# Patient Record
Sex: FEMALE | Race: WHITE
[De-identification: names, ages, dates, MRNs, and addresses within clinical notes are randomized per-mention and may not be internally consistent; named-entity substitution may affect disease eponyms.]

---

## 2020-08-10 ENCOUNTER — APPOINTMENT (OUTPATIENT)
Dept: ORTHOPEDIC SURGERY | Facility: CLINIC | Age: 50
End: 2020-08-10
Payer: OTHER MISCELLANEOUS

## 2020-08-10 VITALS
DIASTOLIC BLOOD PRESSURE: 96 MMHG | WEIGHT: 155 LBS | BODY MASS INDEX: 27.46 KG/M2 | HEART RATE: 73 BPM | SYSTOLIC BLOOD PRESSURE: 149 MMHG | HEIGHT: 63 IN

## 2020-08-10 VITALS — TEMPERATURE: 97.1 F

## 2020-08-10 DIAGNOSIS — M17.12 UNILATERAL PRIMARY OSTEOARTHRITIS, LEFT KNEE: ICD-10-CM

## 2020-08-10 PROCEDURE — 73562 X-RAY EXAM OF KNEE 3: CPT | Mod: LT

## 2020-08-10 PROCEDURE — 99204 OFFICE O/P NEW MOD 45 MIN: CPT

## 2020-08-10 NOTE — PHYSICAL EXAM
[Normal] : Gait: normal [Antalgic] : not antalgic [de-identified] : GENERAL APPEARANCE: Well nourished and hydrated, pleasant, alert, and oriented x 3. Appears their stated age. \par HEENT: Normocephalic, extraocular eye motion intact. Nasal septum midline. Oral cavity clear. External auditory canal clear. \par RESPIRATORY: Breath sounds clear and audible in all lobes. No wheezing, No accessory muscle use.\par CARDIOVASCULAR: No apparent abnormalities. No lower leg edema. No varicosities. Pedal pulses are palpable.\par NEUROLOGIC: Sensation is normal, no muscle weakness in the upper or lower extremities.\par DERMATOLOGIC: No apparent skin lesions, moist, warm, no rash.\par SPINE: Cervical spine appears normal and moves freely; thoracic spine appears normal and moves freely; lumbosacral spine appears normal and moves freely, normal, nontender.\par MUSCULOSKELETAL: Hands, wrists, and elbows are normal and move freely, shoulders are normal and move freely. \par Musculoskeletal: Gait: normal.  [de-identified] : Left knee exam shows medial joint line tenderness and FROM [de-identified] : 3V xray of the left knee done in the office today and reviewed by Dr. Richie Pacheco demonstrates mild medial compartmental osteoarthritis\par \par MRI of the left knee performed on 3/30/2018 showed the following:\par \par Sprain medial collateral ligament\par \par Joint effusion and popliteal cyst\par \par Findings suggesting fat pad impingement superior to patella due to quadriceps mechanism with adjacent distal quadriceps reactive edema\par origin medial head of gastrocnemius tendinosis with associated focal intrasubstance partial tear, adjacent ganglion and what appears to be mild adjacent reactive medial femoral condyle bone marrow edema pattern suggesting reactive osteitis\par \par No meniscal tear \par

## 2020-08-10 NOTE — HISTORY OF PRESENT ILLNESS
[Pain Location] : pain [Worsening] : worsening [___ yrs] : [unfilled] year(s) ago [5] : a current pain level of 5/10 [3] : a minimum pain level of 3/10 [8] : a maximum pain level of 8/10 [de-identified] : 48 y/o F presents with left knee pain. The incidence occurred on 2/16/2018. Pt bent down at work. She felt a pop in her lateral knee which resulted in pain in the medial and posterior aspect of the knee. She previously had PT in 2018 and two rounds of HA injections. The first round of HA injections provided relief, whereas the second round of HA injections did not provide relief. The pain is intermittent. She states the pain is sharp. Pt takes Tylenol and Diclofenac which does not provide relief. Pt also c/o of right hip and right knee pain.

## 2020-08-10 NOTE — END OF VISIT
[FreeTextEntry3] : I, Anthony Negro, acted solely as a scribe for Dr. Richie Pacheco on this date 08/10/2020.

## 2020-08-10 NOTE — DISCUSSION/SUMMARY
[de-identified] : 50 y/o F with mild medial compartmental osteoarthritis of the left knee. Conservative therapy and surgical options discussed in detail with patient. Pt should continue to take ibuprofen when she is in pain. We prescribed PT. She will get another MRI of her left knee for further evaluation. F/u with us after the MRI.

## 2022-08-08 ENCOUNTER — CONSULTATION/EVALUATION (OUTPATIENT)
Facility: LOCATION | Age: 52
End: 2022-08-08

## 2022-08-08 DIAGNOSIS — H04.123: ICD-10-CM

## 2022-08-08 DIAGNOSIS — H25.813: ICD-10-CM

## 2022-08-08 PROCEDURE — 92136 OPHTHALMIC BIOMETRY: CPT

## 2022-08-08 PROCEDURE — 92134 CPTRZ OPH DX IMG PST SGM RTA: CPT

## 2022-08-08 PROCEDURE — 92025 CPTRIZED CORNEAL TOPOGRAPHY: CPT

## 2022-08-08 PROCEDURE — 99204 OFFICE O/P NEW MOD 45 MIN: CPT

## 2022-08-08 ASSESSMENT — VISUAL ACUITY
OD_PAM: 20/30
OU_SC: J2
OU_SC: 20/30-1
OS_BAT: 20/80
OS_SC: 20/30
OD_SC: 20/30-1
OD_BAT: 20/400
OS_SC: J1
OD_SC: J5

## 2022-08-08 ASSESSMENT — TONOMETRY
OD_IOP_MMHG: 15
OS_IOP_MMHG: 15

## 2022-09-07 ENCOUNTER — SURGERY/PROCEDURE (OUTPATIENT)
Facility: LOCATION | Age: 52
End: 2022-09-07

## 2022-09-07 DIAGNOSIS — H25.811: ICD-10-CM

## 2022-09-07 PROCEDURE — 6698454 REMOVE CATARACT;INSERT LENS (SX ONLY)

## 2022-09-08 ENCOUNTER — POST-OP (OUTPATIENT)
Facility: LOCATION | Age: 52
End: 2022-09-08

## 2022-09-08 DIAGNOSIS — Z96.1: ICD-10-CM

## 2022-09-08 PROCEDURE — 99024 POSTOP FOLLOW-UP VISIT: CPT

## 2022-09-08 ASSESSMENT — TONOMETRY
OS_IOP_MMHG: 20
OD_IOP_MMHG: 22

## 2022-09-08 ASSESSMENT — VISUAL ACUITY
OS_SC: 20/40
OD_SC: 20/20

## 2022-09-21 ENCOUNTER — SURGERY/PROCEDURE (OUTPATIENT)
Facility: LOCATION | Age: 52
End: 2022-09-21

## 2022-09-21 DIAGNOSIS — H25.812: ICD-10-CM

## 2022-09-21 PROCEDURE — 6698454 REMOVE CATARACT;INSERT LENS (SX ONLY)

## 2022-09-22 ENCOUNTER — POST-OP (OUTPATIENT)
Facility: LOCATION | Age: 52
End: 2022-09-22

## 2022-09-22 DIAGNOSIS — Z96.1: ICD-10-CM

## 2022-09-22 PROCEDURE — 99024 POSTOP FOLLOW-UP VISIT: CPT

## 2022-09-22 ASSESSMENT — VISUAL ACUITY
OS_SC: 20/25-2
OD_SC: 20/20

## 2022-09-22 ASSESSMENT — TONOMETRY
OS_IOP_MMHG: 22
OD_IOP_MMHG: 16